# Patient Record
Sex: FEMALE | Race: WHITE | URBAN - NONMETROPOLITAN AREA
[De-identification: names, ages, dates, MRNs, and addresses within clinical notes are randomized per-mention and may not be internally consistent; named-entity substitution may affect disease eponyms.]

---

## 2022-08-31 ENCOUNTER — CLAIMS CREATED FROM THE CLAIM WINDOW (OUTPATIENT)
Dept: URBAN - NONMETROPOLITAN AREA CLINIC 4 | Facility: CLINIC | Age: 62
End: 2022-08-31
Payer: OTHER GOVERNMENT

## 2022-08-31 ENCOUNTER — WEB ENCOUNTER (OUTPATIENT)
Dept: URBAN - NONMETROPOLITAN AREA CLINIC 4 | Facility: CLINIC | Age: 62
End: 2022-08-31

## 2022-08-31 ENCOUNTER — LAB OUTSIDE AN ENCOUNTER (OUTPATIENT)
Dept: URBAN - NONMETROPOLITAN AREA CLINIC 4 | Facility: CLINIC | Age: 62
End: 2022-08-31

## 2022-08-31 ENCOUNTER — TELEPHONE ENCOUNTER (OUTPATIENT)
Dept: URBAN - METROPOLITAN AREA CLINIC 92 | Facility: CLINIC | Age: 62
End: 2022-08-31

## 2022-08-31 VITALS
WEIGHT: 149 LBS | HEIGHT: 61 IN | SYSTOLIC BLOOD PRESSURE: 124 MMHG | TEMPERATURE: 97.9 F | HEART RATE: 97 BPM | DIASTOLIC BLOOD PRESSURE: 63 MMHG | BODY MASS INDEX: 28.13 KG/M2

## 2022-08-31 DIAGNOSIS — C79.31 BRAIN METASTASIS: ICD-10-CM

## 2022-08-31 DIAGNOSIS — D50.0 IRON DEFICIENCY ANEMIA DUE TO CHRONIC BLOOD LOSS: ICD-10-CM

## 2022-08-31 DIAGNOSIS — C50.819 MALIGNANT NEOPLASM OF OVERLAPPING SITES OF UNSPECIFIED FEMALE BREAST: ICD-10-CM

## 2022-08-31 DIAGNOSIS — K31.811 GASTRIC HEMORRHAGE DUE TO GASTRIC ANTRAL VASCULAR ECTASIA (GAVE): ICD-10-CM

## 2022-08-31 DIAGNOSIS — G25.81 RLS (RESTLESS LEGS SYNDROME): ICD-10-CM

## 2022-08-31 DIAGNOSIS — C50.919 MALIGNANT NEOPLASM OF FEMALE BREAST, UNSPECIFIED ESTROGEN RECEPTOR STATUS, UNSPECIFIED LATERALITY, UNSPECIFIED SITE OF BREAST: ICD-10-CM

## 2022-08-31 PROCEDURE — 99204 OFFICE O/P NEW MOD 45 MIN: CPT | Performed by: REGISTERED NURSE

## 2022-08-31 PROCEDURE — 99204 OFFICE O/P NEW MOD 45 MIN: CPT | Performed by: INTERNAL MEDICINE

## 2022-08-31 RX ORDER — FERROUS SULFATE 325(65) MG
1 TABLET TABLET ORAL ONCE A DAY
Status: ACTIVE | COMMUNITY

## 2022-08-31 RX ORDER — PANTOPRAZOLE SODIUM 40 MG/1
1 TABLET TABLET, DELAYED RELEASE ORAL ONCE A DAY
Status: ACTIVE | COMMUNITY

## 2022-08-31 RX ORDER — SUCRALFATE 1 G/1
1 TABLET ON AN EMPTY STOMACH TABLET ORAL TWICE A DAY
Status: ACTIVE | COMMUNITY

## 2022-08-31 NOTE — HPI-TODAY'S VISIT:
8/31/22: Pt is a 63 yo female with PMH of metastatic breast ca who was referred by Dr. 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  A copy of this document will be sent to the referring physician.  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   She was found with KRYSTEN in May while living in [place]. Has undergone several EGDs w/ APC as well as colonoscopy and pillcam. Last EGD on 8/18/22. Previously followed by Dr. Sanchez with [facility].  She currently takes Protonix and Carafate. Denies any overt GI bleeding. Stools are dark due to oral iron supplement. Per labs 8/30/22, Hgb 7.6. She is scheduled for blood transfusion today. She has had recent iron infusions in past couple of weeks.

## 2022-09-08 ENCOUNTER — OFFICE VISIT (OUTPATIENT)
Dept: URBAN - METROPOLITAN AREA MEDICAL CENTER 23 | Facility: MEDICAL CENTER | Age: 62
End: 2022-09-08
Payer: OTHER GOVERNMENT

## 2022-09-08 ENCOUNTER — TELEPHONE ENCOUNTER (OUTPATIENT)
Dept: URBAN - METROPOLITAN AREA CLINIC 92 | Facility: CLINIC | Age: 62
End: 2022-09-08

## 2022-09-08 DIAGNOSIS — K31.819 ACQUIRED ARTERIOVENOUS MALFORMATION OF STOMACH: ICD-10-CM

## 2022-09-08 DIAGNOSIS — D50.9 ANEMIA: ICD-10-CM

## 2022-09-08 PROCEDURE — 43270 EGD LESION ABLATION: CPT | Performed by: INTERNAL MEDICINE

## 2022-09-08 RX ORDER — SUCRALFATE 1 G/1
1 TABLET ON AN EMPTY STOMACH TABLET ORAL TWICE A DAY
Status: ACTIVE | COMMUNITY

## 2022-09-08 RX ORDER — FERROUS SULFATE 325(65) MG
1 TABLET TABLET ORAL ONCE A DAY
Status: ACTIVE | COMMUNITY

## 2022-09-08 RX ORDER — PANTOPRAZOLE SODIUM 40 MG/1
1 TABLET TABLET, DELAYED RELEASE ORAL ONCE A DAY
Status: ACTIVE | COMMUNITY

## 2022-09-09 ENCOUNTER — OFFICE VISIT (OUTPATIENT)
Dept: URBAN - METROPOLITAN AREA MEDICAL CENTER 23 | Facility: MEDICAL CENTER | Age: 62
End: 2022-09-09

## 2022-09-15 ENCOUNTER — OFFICE VISIT (OUTPATIENT)
Dept: URBAN - METROPOLITAN AREA SURGERY CENTER 14 | Facility: SURGERY CENTER | Age: 62
End: 2022-09-15

## 2022-09-22 ENCOUNTER — OFFICE VISIT (OUTPATIENT)
Dept: URBAN - NONMETROPOLITAN AREA CLINIC 4 | Facility: CLINIC | Age: 62
End: 2022-09-22

## 2022-09-29 ENCOUNTER — OFFICE VISIT (OUTPATIENT)
Dept: URBAN - METROPOLITAN AREA SURGERY CENTER 14 | Facility: SURGERY CENTER | Age: 62
End: 2022-09-29

## 2022-10-03 ENCOUNTER — TELEPHONE ENCOUNTER (OUTPATIENT)
Dept: URBAN - NONMETROPOLITAN AREA CLINIC 4 | Facility: CLINIC | Age: 62
End: 2022-10-03

## 2022-10-06 ENCOUNTER — LAB OUTSIDE AN ENCOUNTER (OUTPATIENT)
Dept: URBAN - NONMETROPOLITAN AREA CLINIC 4 | Facility: CLINIC | Age: 62
End: 2022-10-06

## 2022-10-06 ENCOUNTER — OFFICE VISIT (OUTPATIENT)
Dept: URBAN - NONMETROPOLITAN AREA CLINIC 4 | Facility: CLINIC | Age: 62
End: 2022-10-06
Payer: OTHER GOVERNMENT

## 2022-10-06 ENCOUNTER — TELEPHONE ENCOUNTER (OUTPATIENT)
Dept: URBAN - METROPOLITAN AREA CLINIC 92 | Facility: CLINIC | Age: 62
End: 2022-10-06

## 2022-10-06 VITALS
BODY MASS INDEX: 29.27 KG/M2 | SYSTOLIC BLOOD PRESSURE: 129 MMHG | WEIGHT: 155 LBS | HEIGHT: 61 IN | TEMPERATURE: 99 F | HEART RATE: 97 BPM | DIASTOLIC BLOOD PRESSURE: 64 MMHG

## 2022-10-06 DIAGNOSIS — D50.0 IRON DEFICIENCY ANEMIA DUE TO CHRONIC BLOOD LOSS: ICD-10-CM

## 2022-10-06 DIAGNOSIS — G25.81 RLS (RESTLESS LEGS SYNDROME): ICD-10-CM

## 2022-10-06 DIAGNOSIS — K76.6 PORTAL HYPERTENSION: ICD-10-CM

## 2022-10-06 DIAGNOSIS — K31.89 OTHER DISEASES OF STOMACH AND DUODENUM: ICD-10-CM

## 2022-10-06 DIAGNOSIS — C50.919 MALIGNANT NEOPLASM OF FEMALE BREAST, UNSPECIFIED ESTROGEN RECEPTOR STATUS, UNSPECIFIED LATERALITY, UNSPECIFIED SITE OF BREAST: ICD-10-CM

## 2022-10-06 DIAGNOSIS — C79.31 BRAIN METASTASIS: ICD-10-CM

## 2022-10-06 DIAGNOSIS — K31.811 GASTRIC HEMORRHAGE DUE TO GASTRIC ANTRAL VASCULAR ECTASIA (GAVE): ICD-10-CM

## 2022-10-06 PROBLEM — 32914008: Status: ACTIVE | Noted: 2022-08-31

## 2022-10-06 PROCEDURE — 99214 OFFICE O/P EST MOD 30 MIN: CPT | Performed by: REGISTERED NURSE

## 2022-10-06 RX ORDER — PANTOPRAZOLE SODIUM 40 MG/1
1 TABLET TABLET, DELAYED RELEASE ORAL ONCE A DAY
Status: ACTIVE | COMMUNITY

## 2022-10-06 RX ORDER — PRAMIPEXOLE DIHYDROCHLORIDE 0.5 MG/1
1 TABLET TABLET ORAL ONCE A DAY
Status: ACTIVE | COMMUNITY

## 2022-10-06 RX ORDER — LORAZEPAM 2 MG/ML
1 ML AS NEEDED CONCENTRATE ORAL TWICE A DAY
Status: ACTIVE | COMMUNITY

## 2022-10-06 RX ORDER — SUCRALFATE 1 G/1
1 TABLET ON AN EMPTY STOMACH TABLET ORAL TWICE A DAY
Status: ACTIVE | COMMUNITY

## 2022-10-06 RX ORDER — LETROZOLE 2.5 MG/1
1 TABLET TABLET, FILM COATED ORAL ONCE A DAY
Status: ACTIVE | COMMUNITY

## 2022-10-06 RX ORDER — FERROUS GLUCONATE 324(37.5)
1 TABLET WITH WATER OR JUICE BETWEEN MEALS TABLET ORAL ONCE A DAY
Status: ACTIVE | COMMUNITY

## 2022-10-06 RX ORDER — FUROSEMIDE 40 MG/1
1 TABLET TABLET ORAL ONCE A DAY
Status: ACTIVE | COMMUNITY

## 2022-10-06 NOTE — HPI-TODAY'S VISIT:
8/31/22: Pt is a 61 yo female with PMH of metastatic breast ca who was referred by Dr. 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  A copy of this document will be sent to the referring physician.  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   She was found with KRYSTEN in May while living in [place]. Has undergone several EGDs w/ APC as well as colonoscopy and pillcam. Last EGD on 8/18/22. Previously followed by Dr. Sanchez with [facility].  She currently takes Protonix and Carafate. Denies any overt GI bleeding. Stools are dark due to oral iron supplement. Per labs 8/30/22, Hgb 7.6. She is scheduled for blood transfusion today. She has had recent iron infusions in past couple of weeks.  10/6/22: Pt RTC for f/u. Had EGD 9/9/22: Impression:   1.	Normal esophagus with gastroesophageal junction at 40 cm from the incisors.  2.	Regular Z-line at 40 cm. 3.	Gastric antrum with multiple vascular ectasias noted.  Multiple lesions ablated with APC set at 25 W with a flow of 1 L/min.  Post ablation images were satisfactory.  Mild oozing noted.  None seen postprocedure. 4.	Normal stomach on retroflexed views.  Subtle mucosal changes suggestive of mild portal hypertensive gastropathy. 5.	Patent and symmetric pylorus. 6.	Normal duodenum.   Since EGD, Hgb dropped to 7.4 on 9/20/22. She received 1 unit pRBC on 9/21/22. Most recent Hgb 8.0 on 10/4. She is feeling like her counts are getting low. No overt GI bleeding reported.

## 2022-10-13 ENCOUNTER — TELEPHONE ENCOUNTER (OUTPATIENT)
Dept: URBAN - NONMETROPOLITAN AREA CLINIC 4 | Facility: CLINIC | Age: 62
End: 2022-10-13

## 2022-10-14 PROBLEM — 303082009: Status: ACTIVE | Noted: 2022-10-06

## 2022-10-26 ENCOUNTER — OUT OF OFFICE VISIT (OUTPATIENT)
Dept: URBAN - NONMETROPOLITAN AREA MEDICAL CENTER 3 | Facility: MEDICAL CENTER | Age: 62
End: 2022-10-26
Payer: OTHER GOVERNMENT

## 2022-10-26 DIAGNOSIS — K92.1 ACUTE MELENA: ICD-10-CM

## 2022-10-26 DIAGNOSIS — D62 ABLA (ACUTE BLOOD LOSS ANEMIA): ICD-10-CM

## 2022-10-26 DIAGNOSIS — K31.819 ACQUIRED ARTERIOVENOUS MALFORMATION OF DUODENUM: ICD-10-CM

## 2022-10-26 PROCEDURE — 99232 SBSQ HOSP IP/OBS MODERATE 35: CPT | Performed by: INTERNAL MEDICINE

## 2022-10-26 PROCEDURE — 43270 EGD LESION ABLATION: CPT | Performed by: INTERNAL MEDICINE

## 2022-10-26 PROCEDURE — G8427 DOCREV CUR MEDS BY ELIG CLIN: HCPCS | Performed by: INTERNAL MEDICINE

## 2022-10-26 PROCEDURE — 99223 1ST HOSP IP/OBS HIGH 75: CPT | Performed by: INTERNAL MEDICINE

## 2022-11-04 ENCOUNTER — OFFICE VISIT (OUTPATIENT)
Dept: URBAN - NONMETROPOLITAN AREA CLINIC 4 | Facility: CLINIC | Age: 62
End: 2022-11-04

## 2022-11-10 ENCOUNTER — OFFICE VISIT (OUTPATIENT)
Dept: URBAN - NONMETROPOLITAN AREA CLINIC 4 | Facility: CLINIC | Age: 62
End: 2022-11-10
Payer: OTHER GOVERNMENT

## 2022-11-10 ENCOUNTER — WEB ENCOUNTER (OUTPATIENT)
Dept: URBAN - NONMETROPOLITAN AREA CLINIC 4 | Facility: CLINIC | Age: 62
End: 2022-11-10

## 2022-11-10 ENCOUNTER — TELEPHONE ENCOUNTER (OUTPATIENT)
Dept: URBAN - METROPOLITAN AREA CLINIC 54 | Facility: CLINIC | Age: 62
End: 2022-11-10

## 2022-11-10 VITALS
HEIGHT: 61 IN | BODY MASS INDEX: 29.07 KG/M2 | TEMPERATURE: 97.3 F | WEIGHT: 154 LBS | SYSTOLIC BLOOD PRESSURE: 115 MMHG | HEART RATE: 106 BPM | DIASTOLIC BLOOD PRESSURE: 63 MMHG

## 2022-11-10 DIAGNOSIS — C50.919 MALIGNANT NEOPLASM OF FEMALE BREAST, UNSPECIFIED ESTROGEN RECEPTOR STATUS, UNSPECIFIED LATERALITY, UNSPECIFIED SITE OF BREAST: ICD-10-CM

## 2022-11-10 DIAGNOSIS — K31.811 GASTRIC HEMORRHAGE DUE TO GASTRIC ANTRAL VASCULAR ECTASIA (GAVE): ICD-10-CM

## 2022-11-10 DIAGNOSIS — D50.0 IRON DEFICIENCY ANEMIA DUE TO CHRONIC BLOOD LOSS: ICD-10-CM

## 2022-11-10 DIAGNOSIS — C79.31 BRAIN METASTASIS: ICD-10-CM

## 2022-11-10 PROCEDURE — 99214 OFFICE O/P EST MOD 30 MIN: CPT | Performed by: PHYSICIAN ASSISTANT

## 2022-11-10 RX ORDER — LORAZEPAM 2 MG/ML
1 ML AS NEEDED CONCENTRATE ORAL TWICE A DAY
Status: ACTIVE | COMMUNITY

## 2022-11-10 RX ORDER — FUROSEMIDE 40 MG/1
1 TABLET TABLET ORAL ONCE A DAY
Status: ACTIVE | COMMUNITY

## 2022-11-10 RX ORDER — PANTOPRAZOLE SODIUM 40 MG/1
1 TABLET TABLET, DELAYED RELEASE ORAL ONCE A DAY
Status: ACTIVE | COMMUNITY

## 2022-11-10 RX ORDER — FERROUS GLUCONATE 324(37.5)
1 TABLET WITH WATER OR JUICE BETWEEN MEALS TABLET ORAL ONCE A DAY
Status: ACTIVE | COMMUNITY

## 2022-11-10 RX ORDER — SUCRALFATE 1 G/1
1 TABLET ON AN EMPTY STOMACH TABLET ORAL TWICE A DAY
Status: ACTIVE | COMMUNITY

## 2022-11-10 RX ORDER — PRAMIPEXOLE DIHYDROCHLORIDE 0.5 MG/1
1 TABLET TABLET ORAL ONCE A DAY
Status: ACTIVE | COMMUNITY

## 2022-11-10 RX ORDER — LETROZOLE 2.5 MG/1
1 TABLET TABLET, FILM COATED ORAL ONCE A DAY
Status: ACTIVE | COMMUNITY

## 2022-11-10 NOTE — HPI-TODAY'S VISIT:
Alicia Delaney is a 62 year old female patient with stage IV breast cancer with mets to the brain currently on letrozole, GAVE, who was recently admitted at [facility] on 10/25  of abnormal lab. Oncologist referred patient to the ED for fruther evaluation and blood transfusion as hgb was noted to be 5.9  During hospitalization, patient underwent EGD that showed GAVE s/p APC. Also underwent EGD with Dr. Mayers on 09/08/2022 that showed: GAVE s/p APC.  Patient remains on PPI BID at home.  On oral iron resulting in chronic black stools. Denies tobacco use, alcohol, and drug use.  Denies NSAID use.  Denies anticoagulation use.  Recent liver MRI performed for further evaluation of liver disease given GAVE shows: Focal liver cysts simple right renal cyst. Otherwise unremarkable exam. I do not see evidence to suggest cirrhotic changes of the liver or findings of portal hypertension. Lab work performed weekly with most recent lab work at 8.1.

## 2022-11-17 ENCOUNTER — OFFICE VISIT (OUTPATIENT)
Dept: URBAN - METROPOLITAN AREA MEDICAL CENTER 23 | Facility: MEDICAL CENTER | Age: 62
End: 2022-11-17

## 2022-11-29 ENCOUNTER — TELEPHONE ENCOUNTER (OUTPATIENT)
Dept: URBAN - NONMETROPOLITAN AREA CLINIC 4 | Facility: CLINIC | Age: 62
End: 2022-11-29

## 2022-12-01 ENCOUNTER — TELEPHONE ENCOUNTER (OUTPATIENT)
Dept: URBAN - METROPOLITAN AREA CLINIC 92 | Facility: CLINIC | Age: 62
End: 2022-12-01

## 2022-12-01 ENCOUNTER — OFFICE VISIT (OUTPATIENT)
Dept: URBAN - NONMETROPOLITAN AREA CLINIC 4 | Facility: CLINIC | Age: 62
End: 2022-12-01

## 2022-12-01 ENCOUNTER — OFFICE VISIT (OUTPATIENT)
Dept: URBAN - METROPOLITAN AREA MEDICAL CENTER 23 | Facility: MEDICAL CENTER | Age: 62
End: 2022-12-01
Payer: OTHER GOVERNMENT

## 2022-12-01 DIAGNOSIS — K31.819 ACQUIRED ARTERIOVENOUS MALFORMATION OF DUODENUM: ICD-10-CM

## 2022-12-01 PROCEDURE — 43270 EGD LESION ABLATION: CPT | Performed by: INTERNAL MEDICINE

## 2022-12-01 RX ORDER — LETROZOLE 2.5 MG/1
1 TABLET TABLET, FILM COATED ORAL ONCE A DAY
Status: ACTIVE | COMMUNITY

## 2022-12-01 RX ORDER — FUROSEMIDE 40 MG/1
1 TABLET TABLET ORAL ONCE A DAY
Status: ACTIVE | COMMUNITY

## 2022-12-01 RX ORDER — PANTOPRAZOLE SODIUM 40 MG/1
1 TABLET TABLET, DELAYED RELEASE ORAL ONCE A DAY
Status: ACTIVE | COMMUNITY

## 2022-12-01 RX ORDER — FERROUS GLUCONATE 324(37.5)
1 TABLET WITH WATER OR JUICE BETWEEN MEALS TABLET ORAL ONCE A DAY
Status: ACTIVE | COMMUNITY

## 2022-12-01 RX ORDER — SUCRALFATE 1 G/1
1 TABLET ON AN EMPTY STOMACH TABLET ORAL TWICE A DAY
Status: ACTIVE | COMMUNITY

## 2022-12-01 RX ORDER — LORAZEPAM 2 MG/ML
1 ML AS NEEDED CONCENTRATE ORAL TWICE A DAY
Status: ACTIVE | COMMUNITY

## 2022-12-01 RX ORDER — PRAMIPEXOLE DIHYDROCHLORIDE 0.5 MG/1
1 TABLET TABLET ORAL ONCE A DAY
Status: ACTIVE | COMMUNITY

## 2022-12-08 ENCOUNTER — TELEPHONE ENCOUNTER (OUTPATIENT)
Dept: URBAN - METROPOLITAN AREA CLINIC 54 | Facility: CLINIC | Age: 62
End: 2022-12-08

## 2022-12-08 ENCOUNTER — LAB OUTSIDE AN ENCOUNTER (OUTPATIENT)
Dept: URBAN - NONMETROPOLITAN AREA CLINIC 4 | Facility: CLINIC | Age: 62
End: 2022-12-08

## 2022-12-08 ENCOUNTER — OFFICE VISIT (OUTPATIENT)
Dept: URBAN - NONMETROPOLITAN AREA CLINIC 4 | Facility: CLINIC | Age: 62
End: 2022-12-08
Payer: OTHER GOVERNMENT

## 2022-12-08 VITALS
WEIGHT: 156.8 LBS | TEMPERATURE: 97 F | HEIGHT: 61 IN | BODY MASS INDEX: 29.6 KG/M2 | HEART RATE: 100 BPM | SYSTOLIC BLOOD PRESSURE: 125 MMHG | DIASTOLIC BLOOD PRESSURE: 62 MMHG

## 2022-12-08 DIAGNOSIS — K31.811 GASTRIC HEMORRHAGE DUE TO GASTRIC ANTRAL VASCULAR ECTASIA (GAVE): ICD-10-CM

## 2022-12-08 DIAGNOSIS — D50.0 IRON DEFICIENCY ANEMIA DUE TO CHRONIC BLOOD LOSS: ICD-10-CM

## 2022-12-08 DIAGNOSIS — C79.31 BRAIN METASTASIS: ICD-10-CM

## 2022-12-08 DIAGNOSIS — C50.919 MALIGNANT NEOPLASM OF FEMALE BREAST, UNSPECIFIED ESTROGEN RECEPTOR STATUS, UNSPECIFIED LATERALITY, UNSPECIFIED SITE OF BREAST: ICD-10-CM

## 2022-12-08 PROBLEM — 188462001 SECONDARY MALIGNANT NEOPLASM OF BRAIN AND SPINAL CORD: Status: ACTIVE | Noted: 2022-10-06

## 2022-12-08 PROBLEM — 372064008: Status: ACTIVE | Noted: 2022-08-31

## 2022-12-08 PROCEDURE — 99214 OFFICE O/P EST MOD 30 MIN: CPT | Performed by: PHYSICIAN ASSISTANT

## 2022-12-08 RX ORDER — LETROZOLE 2.5 MG/1
1 TABLET TABLET, FILM COATED ORAL ONCE A DAY
Status: ACTIVE | COMMUNITY

## 2022-12-08 RX ORDER — LORAZEPAM 2 MG/ML
1 ML AS NEEDED CONCENTRATE ORAL TWICE A DAY
Status: ACTIVE | COMMUNITY

## 2022-12-08 RX ORDER — PANTOPRAZOLE SODIUM 40 MG/1
1 TABLET TABLET, DELAYED RELEASE ORAL ONCE A DAY
Status: ACTIVE | COMMUNITY

## 2022-12-08 RX ORDER — SUCRALFATE 1 G/1
1 TABLET ON AN EMPTY STOMACH TABLET ORAL TWICE A DAY
Status: ACTIVE | COMMUNITY

## 2022-12-08 RX ORDER — FUROSEMIDE 40 MG/1
1 TABLET TABLET ORAL ONCE A DAY
Status: ACTIVE | COMMUNITY

## 2022-12-08 RX ORDER — PANTOPRAZOLE SODIUM 40 MG/1
1 TABLET TABLET, DELAYED RELEASE ORAL TWICE A DAY
Qty: 60 TABLET | Refills: 6

## 2022-12-08 RX ORDER — PRAMIPEXOLE DIHYDROCHLORIDE 0.5 MG/1
1 TABLET TABLET ORAL ONCE A DAY
Status: ACTIVE | COMMUNITY

## 2022-12-08 RX ORDER — FERROUS GLUCONATE 324(37.5)
1 TABLET WITH WATER OR JUICE BETWEEN MEALS TABLET ORAL ONCE A DAY
Status: ACTIVE | COMMUNITY

## 2022-12-08 RX ORDER — SODIUM, POTASSIUM,MAG SULFATES 17.5-3.13G
177ML SOLUTION, RECONSTITUTED, ORAL ORAL
Qty: 1 | OUTPATIENT
Start: 2022-12-08 | End: 2022-12-10

## 2022-12-08 NOTE — PHYSICAL EXAM HENT:
Head,  normocephalic,  atraumatic,  Face,  Face within normal limits,  Ears,  External ears within normal limits,  Nose/Nasopharynx,  External nose  normal appearance, no nasal discharge,  Mouth and Throat,  Breath odor normal,  Lips,  Appearance normal , Head, normocephalic, atraumatic, Face, Face within normal limits, Ears, External ears within normal limits

## 2022-12-08 NOTE — PHYSICAL EXAM CHEST:
No tachypnea. No respiratory distress. , chest wall non-tender, breathing is unlabored without accessory muscle use, normal breath sounds

## 2022-12-08 NOTE — HPI-TODAY'S VISIT:
Alicia Delaney is a 62 year old female patient with stage IV breast cancer with mets to the brain currently on letrozole, GAVE, who was recently admitted at [facility] on 10/25  of abnormal lab. Oncologist referred patient to the ED for fruther evaluation and blood transfusion as hgb was noted to be 5.9  During hospitalization, patient underwent EGD that showed GAVE s/p APC. Also underwent EGD with Dr. Mayers on 09/08/2022 that showed: GAVE s/p APC.  Patient remains on PPI BID at home.  On oral iron resulting in chronic black stools. Denies tobacco use, alcohol, and drug use.  Denies NSAID use.  Denies anticoagulation use.  Recent liver MRI performed for further evaluation of liver disease given GAVE shows: Focal liver cysts simple right renal cyst. Otherwise unremarkable exam. I do not see evidence to suggest cirrhotic changes of the liver or findings of portal hypertension. Lab work performed weekly with most recent lab work at 8.1.  12/8: Here for post EGD follow up. EGD performed on 12/1 with active melena reported and showed: 1.	Normal esophagus with gastroesophageal junction at 40 cm from the incisors.  Mild oozing noted at the GE junction.  Ablated with APC set at 25 W with a flow of 1 L/min. 2.	Regular Z-line at 40 cm. 3.	Gastric antrum with multiple vascular ectasias noted.  Multiple lesions ablated with APC set at 25 W with a flow of 1 L/min.  Post ablation images were satisfactory.  Mild oozing noted.  None seen postprocedure. 4.	Normal stomach on retroflexed views. 5.	Patent and symmetric pylorus. 6.	Normal duodenum. Repeat lab work on 12/6 showed hgb improved from 7.5 to 9.1. Patient reports at last EGD it was discussed patient could proceed with colonoscopy for complete workup.

## 2022-12-08 NOTE — HPI-TODAY'S VISIT:
8/31/22: Pt is a 63 yo female with PMH of metastatic breast ca who was referred by Dr. 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  A copy of this document will be sent to the referring physician.  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   She was found with KRYSTEN in May while living in [place]. Has undergone several EGDs w/ APC as well as colonoscopy and pillcam. Last EGD on 8/18/22. Previously followed by Dr. Sanchez with [facility].  She currently takes Protonix and Carafate. Denies any overt GI bleeding. Stools are dark due to oral iron supplement. Per labs 8/30/22, Hgb 7.6. She is scheduled for blood transfusion today. She has had recent iron infusions in past couple of weeks.  10/6/22: Pt RTC for f/u. Had EGD 9/9/22: Impression:   1.	Normal esophagus with gastroesophageal junction at 40 cm from the incisors.  2.	Regular Z-line at 40 cm. 3.	Gastric antrum with multiple vascular ectasias noted.  Multiple lesions ablated with APC set at 25 W with a flow of 1 L/min.  Post ablation images were satisfactory.  Mild oozing noted.  None seen postprocedure. 4.	Normal stomach on retroflexed views.  Subtle mucosal changes suggestive of mild portal hypertensive gastropathy. 5.	Patent and symmetric pylorus. 6.	Normal duodenum.   Since EGD, Hgb dropped to 7.4 on 9/20/22. She received 1 unit pRBC on 9/21/22. Most recent Hgb 8.0 on 10/4. She is feeling like her counts are getting low. No overt GI bleeding reported.

## 2022-12-08 NOTE — PHYSICAL EXAM GASTROINTESTINAL
Abdomen , soft, nontender, nondistended , no guarding or rigidity , no masses palpable , , Abdomen , soft, nontender, nondistended , no guarding or rigidity , no masses palpable , normal bowel sounds , Liver and Spleen,  no hepatosplenomegaly , liver nontender

## 2022-12-09 ENCOUNTER — TELEPHONE ENCOUNTER (OUTPATIENT)
Dept: URBAN - METROPOLITAN AREA CLINIC 54 | Facility: CLINIC | Age: 62
End: 2022-12-09

## 2022-12-09 LAB
HEMATOCRIT: 25.8
HEMOGLOBIN: 8.2
MCH: 28
MCHC: 31.8
MCV: 88.1
MPV: 10.7
PLATELET COUNT: 218
RDW: 14.7
RED BLOOD CELL COUNT: 2.93
WHITE BLOOD CELL COUNT: 8.1

## 2022-12-11 ENCOUNTER — WEB ENCOUNTER (OUTPATIENT)
Dept: URBAN - METROPOLITAN AREA CLINIC 54 | Facility: CLINIC | Age: 62
End: 2022-12-11

## 2022-12-22 ENCOUNTER — TELEPHONE ENCOUNTER (OUTPATIENT)
Dept: URBAN - METROPOLITAN AREA CLINIC 92 | Facility: CLINIC | Age: 62
End: 2022-12-22

## 2022-12-22 ENCOUNTER — OFFICE VISIT (OUTPATIENT)
Dept: URBAN - METROPOLITAN AREA MEDICAL CENTER 23 | Facility: MEDICAL CENTER | Age: 62
End: 2022-12-22
Payer: OTHER GOVERNMENT

## 2022-12-22 DIAGNOSIS — K92.1 ACUTE MELENA: ICD-10-CM

## 2022-12-22 DIAGNOSIS — K31.A11 GASTRIC INTESTINAL METAPLASIA WITHOUT DYSPLASIA, INVOLVING THE ANTRUM: ICD-10-CM

## 2022-12-22 DIAGNOSIS — D62 ABLA (ACUTE BLOOD LOSS ANEMIA): ICD-10-CM

## 2022-12-22 PROCEDURE — 45378 DIAGNOSTIC COLONOSCOPY: CPT | Performed by: INTERNAL MEDICINE

## 2022-12-22 PROCEDURE — 43255 EGD CONTROL BLEEDING ANY: CPT | Performed by: INTERNAL MEDICINE

## 2022-12-22 RX ORDER — FERROUS GLUCONATE 324(37.5)
1 TABLET WITH WATER OR JUICE BETWEEN MEALS TABLET ORAL ONCE A DAY
Status: ACTIVE | COMMUNITY

## 2022-12-22 RX ORDER — PRAMIPEXOLE DIHYDROCHLORIDE 0.5 MG/1
1 TABLET TABLET ORAL ONCE A DAY
Status: ACTIVE | COMMUNITY

## 2022-12-22 RX ORDER — PANTOPRAZOLE SODIUM 40 MG/1
1 TABLET TABLET, DELAYED RELEASE ORAL ONCE A DAY
Status: ACTIVE | COMMUNITY

## 2022-12-22 RX ORDER — FUROSEMIDE 40 MG/1
1 TABLET TABLET ORAL ONCE A DAY
Status: ACTIVE | COMMUNITY

## 2022-12-22 RX ORDER — LORAZEPAM 2 MG/ML
1 ML AS NEEDED CONCENTRATE ORAL TWICE A DAY
Status: ACTIVE | COMMUNITY

## 2022-12-22 RX ORDER — LETROZOLE 2.5 MG/1
1 TABLET TABLET, FILM COATED ORAL ONCE A DAY
Status: ACTIVE | COMMUNITY

## 2022-12-22 RX ORDER — PANTOPRAZOLE SODIUM 40 MG/1
1 TABLET TABLET, DELAYED RELEASE ORAL TWICE A DAY
Qty: 60 TABLET | Refills: 6 | Status: ACTIVE | COMMUNITY

## 2022-12-22 RX ORDER — SUCRALFATE 1 G/1
1 TABLET ON AN EMPTY STOMACH TABLET ORAL TWICE A DAY
Status: ACTIVE | COMMUNITY

## 2022-12-29 ENCOUNTER — OFFICE VISIT (OUTPATIENT)
Dept: URBAN - METROPOLITAN AREA MEDICAL CENTER 23 | Facility: MEDICAL CENTER | Age: 62
End: 2022-12-29

## 2023-01-08 ENCOUNTER — TELEPHONE ENCOUNTER (OUTPATIENT)
Dept: URBAN - METROPOLITAN AREA CLINIC 92 | Facility: CLINIC | Age: 63
End: 2023-01-08

## 2023-01-12 ENCOUNTER — OFFICE VISIT (OUTPATIENT)
Dept: URBAN - NONMETROPOLITAN AREA CLINIC 4 | Facility: CLINIC | Age: 63
End: 2023-01-12
Payer: OTHER GOVERNMENT

## 2023-01-12 ENCOUNTER — TELEPHONE ENCOUNTER (OUTPATIENT)
Dept: URBAN - METROPOLITAN AREA CLINIC 63 | Facility: CLINIC | Age: 63
End: 2023-01-12

## 2023-01-12 VITALS
BODY MASS INDEX: 29.45 KG/M2 | WEIGHT: 156 LBS | TEMPERATURE: 99.1 F | HEIGHT: 61 IN | SYSTOLIC BLOOD PRESSURE: 144 MMHG | HEART RATE: 103 BPM | DIASTOLIC BLOOD PRESSURE: 67 MMHG

## 2023-01-12 DIAGNOSIS — D50.0 IRON DEFICIENCY ANEMIA DUE TO CHRONIC BLOOD LOSS: ICD-10-CM

## 2023-01-12 DIAGNOSIS — K31.811 GASTRIC HEMORRHAGE DUE TO GASTRIC ANTRAL VASCULAR ECTASIA (GAVE): ICD-10-CM

## 2023-01-12 PROBLEM — 1086951000119108: Status: ACTIVE | Noted: 2022-10-07

## 2023-01-12 PROBLEM — 724556004: Status: ACTIVE | Noted: 2022-08-31

## 2023-01-12 PROCEDURE — 99213 OFFICE O/P EST LOW 20 MIN: CPT | Performed by: PHYSICIAN ASSISTANT

## 2023-01-12 RX ORDER — LORAZEPAM 2 MG/ML
1 ML AS NEEDED CONCENTRATE ORAL TWICE A DAY
Status: ACTIVE | COMMUNITY

## 2023-01-12 RX ORDER — PRAMIPEXOLE DIHYDROCHLORIDE 0.5 MG/1
1 TABLET TABLET ORAL ONCE A DAY
Status: ACTIVE | COMMUNITY

## 2023-01-12 RX ORDER — PANTOPRAZOLE SODIUM 40 MG/1
1 TABLET TABLET, DELAYED RELEASE ORAL ONCE A DAY
Status: ACTIVE | COMMUNITY

## 2023-01-12 RX ORDER — SUCRALFATE 1 G/1
1 TABLET ON AN EMPTY STOMACH TABLET ORAL TWICE A DAY
Status: ACTIVE | COMMUNITY

## 2023-01-12 RX ORDER — LETROZOLE 2.5 MG/1
1 TABLET TABLET, FILM COATED ORAL ONCE A DAY
Status: ACTIVE | COMMUNITY

## 2023-01-12 RX ORDER — PANTOPRAZOLE SODIUM 40 MG/1
1 TABLET TABLET, DELAYED RELEASE ORAL TWICE A DAY
Qty: 60 TABLET | Refills: 6 | Status: ACTIVE | COMMUNITY

## 2023-01-12 RX ORDER — FERROUS GLUCONATE 324(37.5)
1 TABLET WITH WATER OR JUICE BETWEEN MEALS TABLET ORAL ONCE A DAY
Status: ACTIVE | COMMUNITY

## 2023-01-12 RX ORDER — FUROSEMIDE 40 MG/1
1 TABLET TABLET ORAL ONCE A DAY
Status: ACTIVE | COMMUNITY

## 2023-01-12 NOTE — HPI-TODAY'S VISIT:
Alicia Delaney is a 62 year old female patient with stage IV breast cancer with mets to the brain currently on letrozole, GAVE, who was recently admitted at [facility] on 10/25  of abnormal lab. Oncologist referred patient to the ED for fruther evaluation and blood transfusion as hgb was noted to be 5.9  During hospitalization, patient underwent EGD that showed GAVE s/p APC. Also underwent EGD with Dr. Mayers on 09/08/2022 that showed: GAVE s/p APC.  Patient remains on PPI BID at home.  On oral iron resulting in chronic black stools. Denies tobacco use, alcohol, and drug use.  Denies NSAID use.  Denies anticoagulation use.  Recent liver MRI performed for further evaluation of liver disease given GAVE shows: Focal liver cysts simple right renal cyst. Otherwise unremarkable exam. I do not see evidence to suggest cirrhotic changes of the liver or findings of portal hypertension. Lab work performed weekly with most recent lab work at 8.1.  12/8: Here for post EGD follow up. EGD performed on 12/1 with active melena reported and showed: 1.	Normal esophagus with gastroesophageal junction at 40 cm from the incisors.  Mild oozing noted at the GE junction.  Ablated with APC set at 25 W with a flow of 1 L/min. 2.	Regular Z-line at 40 cm. 3.	Gastric antrum with multiple vascular ectasias noted.  Multiple lesions ablated with APC set at 25 W with a flow of 1 L/min.  Post ablation images were satisfactory.  Mild oozing noted.  None seen postprocedure. 4.	Normal stomach on retroflexed views. 5.	Patent and symmetric pylorus. 6.	Normal duodenum. Repeat lab work on 12/6 showed hgb improved from 7.5 to 9.1. Patient reports at last EGD it was discussed patient could proceed with colonoscopy for complete workup.  1/12/23: Here for EGD and colonoscopy follow-up with Dr Mayers on 12/22/22. EGD showed healed GAVE s/p ablation in past.  No bleeding identified. Normal stomach on retroflexed views. Multiple bleeding AVMs in the duodenal sweep and second portion.  S/p APC. Colonoscopy showed normal cecum, ascending, transverse, descending and rectosigmoid colon as well as small hemorrhoids on retroflexed views. Outside labs reviewed that showed hgb 9.2 and two days ago showed 8.0.  After a brief discussion about endoscopic findings and hgb, patient stated she paid the $40 co-pay to inform our group she is leaving and has an appointment with Dr Schulz with [facility] tomorrow and would like her infomration sent to their group_ by tomorrow as she is displeased with office staff.

## 2023-01-12 NOTE — HPI-TODAY'S VISIT:
8/31/22: Pt is a 61 yo female with PMH of metastatic breast ca who was referred by Dr. 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  A copy of this document will be sent to the referring physician.  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   She was found with KRYSTEN in May while living in [place]. Has undergone several EGDs w/ APC as well as colonoscopy and pillcam. Last EGD on 8/18/22. Previously followed by Dr. Sanchez with [facility].  She currently takes Protonix and Carafate. Denies any overt GI bleeding. Stools are dark due to oral iron supplement. Per labs 8/30/22, Hgb 7.6. She is scheduled for blood transfusion today. She has had recent iron infusions in past couple of weeks.  10/6/22: Pt RTC for f/u. Had EGD 9/9/22: Impression:   1.	Normal esophagus with gastroesophageal junction at 40 cm from the incisors.  2.	Regular Z-line at 40 cm. 3.	Gastric antrum with multiple vascular ectasias noted.  Multiple lesions ablated with APC set at 25 W with a flow of 1 L/min.  Post ablation images were satisfactory.  Mild oozing noted.  None seen postprocedure. 4.	Normal stomach on retroflexed views.  Subtle mucosal changes suggestive of mild portal hypertensive gastropathy. 5.	Patent and symmetric pylorus. 6.	Normal duodenum.   Since EGD, Hgb dropped to 7.4 on 9/20/22. She received 1 unit pRBC on 9/21/22. Most recent Hgb 8.0 on 10/4. She is feeling like her counts are getting low. No overt GI bleeding reported.

## 2023-01-12 NOTE — PHYSICAL EXAM HENT:
Head,  normocephalic,  atraumatic,  Face,  Face within normal limits,  Ears,  External ears within normal limits,  Nose/Nasopharynx,  External nose  normal appearance, no nasal discharge,  Mouth and Throat,  Breath odor normal,  Lips,  Appearance normal

## 2023-02-08 ENCOUNTER — OFFICE VISIT (OUTPATIENT)
Dept: URBAN - NONMETROPOLITAN AREA CLINIC 4 | Facility: CLINIC | Age: 63
End: 2023-02-08

## 2023-02-23 ENCOUNTER — ERX REFILL RESPONSE (OUTPATIENT)
Dept: URBAN - NONMETROPOLITAN AREA CLINIC 4 | Facility: CLINIC | Age: 63
End: 2023-02-23

## 2023-02-23 RX ORDER — PANTOPRAZOLE SODIUM 40 MG/1
TAKE 1 TABLET BY MOUTH TWICE A DAY TABLET, DELAYED RELEASE ORAL
Qty: 60 TABLET | Refills: 3 | OUTPATIENT

## 2023-02-23 RX ORDER — PANTOPRAZOLE SODIUM 40 MG/1
1 TABLET TABLET, DELAYED RELEASE ORAL ONCE A DAY
OUTPATIENT

## 2023-07-24 ENCOUNTER — TELEPHONE ENCOUNTER (OUTPATIENT)
Dept: URBAN - METROPOLITAN AREA CLINIC 23 | Facility: CLINIC | Age: 63
End: 2023-07-24

## 2023-11-22 ENCOUNTER — DASHBOARD ENCOUNTERS (OUTPATIENT)
Age: 63
End: 2023-11-22

## 2023-12-01 ENCOUNTER — OFFICE VISIT (OUTPATIENT)
Dept: URBAN - NONMETROPOLITAN AREA CLINIC 4 | Facility: CLINIC | Age: 63
End: 2023-12-01

## 2023-12-01 RX ORDER — FERROUS GLUCONATE 324(37.5)
1 TABLET WITH WATER OR JUICE BETWEEN MEALS TABLET ORAL ONCE A DAY
Status: ACTIVE | COMMUNITY

## 2023-12-01 RX ORDER — LETROZOLE 2.5 MG/1
1 TABLET TABLET, FILM COATED ORAL ONCE A DAY
Status: ACTIVE | COMMUNITY

## 2023-12-01 RX ORDER — SUCRALFATE 1 G/1
1 TABLET ON AN EMPTY STOMACH TABLET ORAL TWICE A DAY
Status: ACTIVE | COMMUNITY

## 2023-12-01 RX ORDER — PANTOPRAZOLE SODIUM 40 MG/1
TAKE 1 TABLET BY MOUTH TWICE A DAY TABLET, DELAYED RELEASE ORAL
Qty: 60 TABLET | Refills: 3 | Status: ACTIVE | COMMUNITY

## 2023-12-01 RX ORDER — PRAMIPEXOLE DIHYDROCHLORIDE 0.5 MG/1
1 TABLET TABLET ORAL ONCE A DAY
Status: ACTIVE | COMMUNITY

## 2023-12-01 RX ORDER — NITROFURANTOIN MONOHYDRATE/MACROCRYSTALLINE 25; 75 MG/1; MG/1
1 CAPSULE WITH FOOD CAPSULE ORAL
Status: ACTIVE | COMMUNITY

## 2023-12-01 RX ORDER — OCTREOTIDE ACETATE,MI-SPHERES 10 MG
AS DIRECTED VIAL (EA) INTRAMUSCULAR
Status: ACTIVE | COMMUNITY

## 2023-12-01 RX ORDER — LACOSAMIDE 100 MG/1
1 TABLET TABLET, FILM COATED ORAL TWICE A DAY
Status: ACTIVE | COMMUNITY

## 2023-12-01 RX ORDER — FUROSEMIDE 40 MG/1
1 TABLET TABLET ORAL ONCE A DAY
Status: ACTIVE | COMMUNITY

## 2023-12-01 RX ORDER — LORAZEPAM 2 MG/ML
1 ML AS NEEDED CONCENTRATE ORAL TWICE A DAY
Status: ACTIVE | COMMUNITY

## 2024-02-02 ENCOUNTER — OV EP (OUTPATIENT)
Dept: URBAN - NONMETROPOLITAN AREA CLINIC 4 | Facility: CLINIC | Age: 64
End: 2024-02-02